# Patient Record
Sex: FEMALE | URBAN - METROPOLITAN AREA
[De-identification: names, ages, dates, MRNs, and addresses within clinical notes are randomized per-mention and may not be internally consistent; named-entity substitution may affect disease eponyms.]

---

## 2018-07-23 ENCOUNTER — TRANSFERRED RECORDS (OUTPATIENT)
Dept: HEALTH INFORMATION MANAGEMENT | Facility: CLINIC | Age: 28
End: 2018-07-23

## 2018-07-23 LAB
CREAT SERPL-MCNC: 0.74 MG/DL (ref 0.55–1.02)
GFR SERPL CREATININE-BSD FRML MDRD: >60 ML/MIN
GLUCOSE SERPL-MCNC: 78 MG/DL (ref 70–110)
POTASSIUM SERPL-SCNC: 3.7 MMOL/L (ref 3.5–5.1)

## 2018-07-24 ENCOUNTER — HOSPITAL ENCOUNTER (INPATIENT)
Facility: CLINIC | Age: 28
LOS: 3 days | Discharge: HOME | DRG: 885 | End: 2018-07-27
Attending: PSYCHIATRY & NEUROLOGY | Admitting: PSYCHIATRY & NEUROLOGY
Payer: MEDICARE

## 2018-07-24 DIAGNOSIS — R45.851 SUICIDAL IDEATION: Primary | ICD-10-CM

## 2018-07-24 LAB
ALBUMIN SERPL-MCNC: 3.1 G/DL (ref 3.4–5)
ALP SERPL-CCNC: 58 U/L (ref 40–150)
ALT SERPL W P-5'-P-CCNC: 26 U/L (ref 0–50)
ANION GAP SERPL CALCULATED.3IONS-SCNC: 6 MMOL/L (ref 3–14)
AST SERPL W P-5'-P-CCNC: 19 U/L (ref 0–45)
BILIRUB SERPL-MCNC: 1 MG/DL (ref 0.2–1.3)
BUN SERPL-MCNC: 8 MG/DL (ref 7–30)
CALCIUM SERPL-MCNC: 8.1 MG/DL (ref 8.5–10.1)
CHLORIDE SERPL-SCNC: 109 MMOL/L (ref 94–109)
CHOLEST SERPL-MCNC: 144 MG/DL
CO2 SERPL-SCNC: 27 MMOL/L (ref 20–32)
CREAT SERPL-MCNC: 0.67 MG/DL (ref 0.52–1.04)
DEPRECATED CALCIDIOL+CALCIFEROL SERPL-MC: 39 UG/L (ref 20–75)
GFR SERPL CREATININE-BSD FRML MDRD: >90 ML/MIN/1.7M2
GLUCOSE SERPL-MCNC: 73 MG/DL (ref 70–99)
HCG UR QL: NEGATIVE
HDLC SERPL-MCNC: 68 MG/DL
LDLC SERPL CALC-MCNC: 67 MG/DL
NONHDLC SERPL-MCNC: 76 MG/DL
POTASSIUM SERPL-SCNC: 3.7 MMOL/L (ref 3.4–5.3)
PROT SERPL-MCNC: 7.3 G/DL (ref 6.8–8.8)
SODIUM SERPL-SCNC: 142 MMOL/L (ref 133–144)
TRIGL SERPL-MCNC: 43 MG/DL
TSH SERPL DL<=0.005 MIU/L-ACNC: 2.08 MU/L (ref 0.4–4)

## 2018-07-24 PROCEDURE — 80061 LIPID PANEL: CPT

## 2018-07-24 PROCEDURE — 81025 URINE PREGNANCY TEST: CPT

## 2018-07-24 PROCEDURE — 80053 COMPREHEN METABOLIC PANEL: CPT

## 2018-07-24 PROCEDURE — A9270 NON-COVERED ITEM OR SERVICE: HCPCS | Mod: GY

## 2018-07-24 PROCEDURE — 82306 VITAMIN D 25 HYDROXY: CPT

## 2018-07-24 PROCEDURE — 36415 COLL VENOUS BLD VENIPUNCTURE: CPT

## 2018-07-24 PROCEDURE — A9270 NON-COVERED ITEM OR SERVICE: HCPCS | Mod: GY | Performed by: STUDENT IN AN ORGANIZED HEALTH CARE EDUCATION/TRAINING PROGRAM

## 2018-07-24 PROCEDURE — 25000132 ZZH RX MED GY IP 250 OP 250 PS 637: Mod: GY | Performed by: STUDENT IN AN ORGANIZED HEALTH CARE EDUCATION/TRAINING PROGRAM

## 2018-07-24 PROCEDURE — 25000132 ZZH RX MED GY IP 250 OP 250 PS 637: Mod: GY

## 2018-07-24 PROCEDURE — 99223 1ST HOSP IP/OBS HIGH 75: CPT | Mod: AI | Performed by: PSYCHIATRY & NEUROLOGY

## 2018-07-24 PROCEDURE — 84443 ASSAY THYROID STIM HORMONE: CPT

## 2018-07-24 PROCEDURE — 12400007 ZZH R&B MH INTERMEDIATE UMMC

## 2018-07-24 RX ORDER — ESCITALOPRAM OXALATE 5 MG/1
5 TABLET ORAL DAILY
Status: DISCONTINUED | OUTPATIENT
Start: 2018-07-24 | End: 2018-07-27 | Stop reason: HOSPADM

## 2018-07-24 RX ORDER — ACETAMINOPHEN 325 MG/1
650 TABLET ORAL EVERY 4 HOURS PRN
Status: DISCONTINUED | OUTPATIENT
Start: 2018-07-24 | End: 2018-07-27 | Stop reason: HOSPADM

## 2018-07-24 RX ORDER — PRAZOSIN HYDROCHLORIDE 1 MG/1
1 CAPSULE ORAL
Status: DISCONTINUED | OUTPATIENT
Start: 2018-07-24 | End: 2018-07-27 | Stop reason: HOSPADM

## 2018-07-24 RX ORDER — HYDROXYZINE HYDROCHLORIDE 25 MG/1
25 TABLET, FILM COATED ORAL EVERY 4 HOURS PRN
Status: DISCONTINUED | OUTPATIENT
Start: 2018-07-24 | End: 2018-07-27 | Stop reason: HOSPADM

## 2018-07-24 RX ORDER — TRAZODONE HYDROCHLORIDE 50 MG/1
50 TABLET, FILM COATED ORAL
Status: DISCONTINUED | OUTPATIENT
Start: 2018-07-24 | End: 2018-07-27 | Stop reason: HOSPADM

## 2018-07-24 RX ADMIN — TRAZODONE HYDROCHLORIDE 50 MG: 50 TABLET ORAL at 21:01

## 2018-07-24 RX ADMIN — ESCITALOPRAM OXALATE 5 MG: 5 TABLET, FILM COATED ORAL at 14:04

## 2018-07-24 ASSESSMENT — ACTIVITIES OF DAILY LIVING (ADL)
RETIRED_EATING: 0-->INDEPENDENT
DRESS: 0-->INDEPENDENT
BATHING: 0-->INDEPENDENT
AMBULATION: 0-->INDEPENDENT
ORAL_HYGIENE: INDEPENDENT
COGNITION: 0 - NO COGNITION ISSUES REPORTED
GROOMING: INDEPENDENT
RETIRED_COMMUNICATION: 0-->UNDERSTANDS/COMMUNICATES WITHOUT DIFFICULTY
TRANSFERRING: 0-->INDEPENDENT
SWALLOWING: 0-->SWALLOWS FOODS/LIQUIDS WITHOUT DIFFICULTY
DRESS: STREET CLOTHES;INDEPENDENT
TOILETING: 0-->INDEPENDENT

## 2018-07-24 NOTE — PLAN OF CARE
Problem: Patient Care Overview  Goal: Individualization & Mutuality  Patient's goals:  Feel Better  Absence of SI    Plan for admission:  1. Stabilization of mood disorder symptoms  2. Absence of SI- safe with self  3. Medication management per MD's  4. Coordination of care with outpatient providers, family  5. Psychiatric follow up care in place
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
## 2018-07-24 NOTE — IP AVS SNAPSHOT
MRN:[id number]                      After Visit Summary   7/24/2018    Nessa Hurley    MRN: [id number]           Thank you!     Thank you for choosing [facility] for your care. Our goal is always to provide you with excellent care.        Patient Information     Date Of Birth          1990        Designated Caregiver       Most Recent Value    Caregiver    Will someone help with your care after discharge? yes    Name of designated caregiver Chata Lutz    Phone number of caregiver [phone]    Caregiver address [address] [place] [address]      About your hospital stay     You were admitted on:  July 24, 2018 You last received care in the:  [facility] 30NR    You were discharged on:  July 27, 2018       Who to Call     For medical emergencies, please call 911.  For non-urgent questions about your medical care, please call your primary care provider or clinic, None          Attending Provider     Provider Alissa Owens MD Psychiatry       Primary Care Provider    None Specified      Further instructions from your care team       Behavioral Discharge Planning and Instructions    Summary:   You were admitted to Station 20 on 7/23/18  with worsening depression, PTSD symptoms and SIB under the care of Dr. Saunders.  You met with Dr. Walton and his team daily for ongoing psychiatric assessment and medication management.  You had opportunities to participate in therapeutic groups on the unit.   At this time you report your mood has stabilized and you report you are not having thoughts or intent to harm yourself or others. You will be discharged home and will resume care with your outpatient providers.  You have been referred for Critical access hospital services and they will contact you with an intake appt.    Disposition:  Home    Main Diagnosis:   Major Depressive Disorder  PTSD    Major Treatments, Procedures and Findings:   Medications were  managed throughout your stay. An  "internal medicine consult was completed during your stay. You had the opportunity to participate in treatment programming while on the unit including occupational therapy, mental health support and education and spiritual services.     Symptoms to Report:   Please report if you are experiencing increased aggression and/or confusion, problematic loss of sleep, worsening mood, or thoughts of suicide to your treatment team or notify your primary provider.   IF THE SYMPTOMS YOU ARE EXPERIENCING ARE A MEDICAL EMERGENCY, CALL 911 IMMEDIATELY    Lifestyle Adjustment:   1. Adjust your lifestyle to get enough sleep, relaxation, exercise and good nutrition.  Continue to develop healthy coping skills to decrease stress and promote a healthy lifestyle.  2. Abstain from all substances of abuse  3. Take medications as prescribed.  Please work with your doctor to discuss any concerns you have with your medications or side effects you may be experiencing.  4. Follow up with appointments as scheduled.        Psychiatry Follow-up:     Appointment: Therapist: Daryl Matson: 7/31/18 at 3:00pm  [facility]: [address] [place] [address]   [phone]    Appointment: Psychiatrist: Dr. Ame Cuenca: 8/3/18 at 1:00pm  [facility]:  [address] [place] [address]  [phone]  (fax: [phone])      [facility] RN Case Manager Georgette : [phone]    A referral has been made for [facility] through [facility] [phone] They will contact you at home to schedule an intake appointment.  PLease call them if you do not hear from them [phone]    Resources:   *[facility] Crisis: COPE: ([phone]) 24 hour mobile crisis support for people having a mental health crisis in [facility].   *Acute Psychiatric Services ([phone]). 24-hour walk-in crisis psychiatric support at [facility]; Emergency Medications Clinic available 7:30am - 2:00pm  *Zkko7tqlq: Text  \"life\"  to [phone]   A " "trained crisis counselor will respond immediately  *Crisis [facility]: ([phone])  24-hour confidential telephone counseling   *[facility] Emergency Room: [phone]    General Medication Instructions:   See your medication sheet(s) for instructions.   Take all medicines as directed.  Make no changes unless your doctor suggests them.   Go to all your doctor visits.  Be sure to have all your required lab tests. This way, your medicines can be refilled on time.  Do not use any drugs not prescribed by your doctor.    The treatment team has appreciated the opportunity to work with you.  We wish you the best in the future.    If you have any questions or concerns our unit number is [phone].     Pending Results     No orders found from 7/22/2018 to 7/25/2018.            Statement of Approval     Ordered          07/27/18 0691  I have reviewed and agree with all the recommendations and orders detailed in this document.  EFFECTIVE NOW     Approved and electronically signed by:  Jacky Lerner MD             Admission Information     Date & Time Provider Department Dept. Phone    7/24/2018 Alissa Saunders MD  30NR [phone]      Your Vitals Were     Blood Pressure Pulse Temperature Respirations Height Weight    105/70 (BP Location: Left arm) 64 98.3  F (36.8  C) (Oral) 16 1.524 m (5') 51.7 kg (114 lb)    Pulse Oximetry BMI (Body Mass Index)                100% 22.26 kg/m2          MyChart Information     [employer] lets you send messages to your doctor, view your test results, renew your prescriptions, schedule appointments and more. To sign up, go to [email or web address] . Click on \"Log in\" on the left side of the screen, which will take you to the Welcome page. Then click on \"Sign up Now\" on the right side of the page.     You will be asked to enter the access code listed below, as well as some personal information. Please follow the directions to create your username " and password.     Your access code is: [email or web address]  Expires: 10/25/2018 12:41 PM     Your access code will  in 90 days. If you need help or a new code, please call your [facility] clinic or [phone].        Care EveryWhere ID     This is your Care EveryWhere ID. This could be used by other organizations to access your [facility] medical records  [id number]        Equal Access to Services     JUS BUCK : Hadii aad ku hadasho Soomaali, waaxda luqadaha, qaybta kaalmada adeegyada, waxay maria estherin hayozn maria luisa mikaelaradames larose . So [facility] [phone].    ATENCIÓN: Si habla garry, tiene a moncada disposición servicios gratuitos de asistencia lingüística. Llame al [phone].    We comply with applicable federal civil rights laws and [place] laws. We do not discriminate on the basis of race, color, national origin, age, disability, sex, sexual orientation, or gender identity.               Review of your medicines      START taking        Dose / Directions    escitalopram 5 MG tablet   Commonly known as:  LEXAPRO        Dose:  5 mg   Start taking on:  2018   Take 1 tablet (5 mg) by mouth daily   Quantity:  30 tablet   Refills:  0       prazosin 1 MG capsule   Commonly known as:  MINIPRESS        Dose:  1 mg   Take 1 capsule (1 mg) by mouth nightly as needed (As needed for sleep difficulties related to nightmares)   Quantity:  30 capsule   Refills:  0       traZODone 50 MG tablet   Commonly known as:  DESYREL        Dose:  50 mg   Take 1 tablet (50 mg) by mouth nightly as needed for sleep   Quantity:  30 tablet   Refills:  0            Where to get your medicines      Some of these will need a paper prescription and others can be bought over the counter. Ask your nurse if you have questions.     Bring a paper prescription for each of these medications     escitalopram 5 MG tablet    prazosin 1 MG capsule    traZODone 50 MG tablet                Protect others around you: Learn how to safely use, store and throw  away your medicines at www.disposemymeds.org.             Medication List: This is a list of all your medications and when to take them. Check marks below indicate your daily home schedule. Keep this list as a reference.      Medications           Morning Afternoon Evening Bedtime As Needed    escitalopram 5 MG tablet   Commonly known as:  LEXAPRO   Take 1 tablet (5 mg) by mouth daily   Start taking on:  7/28/2018   Last time this was given:  5 mg on 7/27/2018  7:59 AM                                prazosin 1 MG capsule   Commonly known as:  MINIPRESS   Take 1 capsule (1 mg) by mouth nightly as needed (As needed for sleep difficulties related to nightmares)   Last time this was given:  1 mg on 7/26/2018  8:42 PM                                traZODone 50 MG tablet   Commonly known as:  DESYREL   Take 1 tablet (50 mg) by mouth nightly as needed for sleep   Last time this was given:  50 mg on 7/26/2018  8:43 PM

## 2018-07-24 NOTE — PLAN OF CARE
Problem: Patient Care Overview  Goal: Team Discussion  Team Plan:   BEHAVIORAL TEAM DISCUSSION    Participants:   Dr. Saunders, Dr. Ruvalcaba, Jessica Giraldo MA.LP, Med student, Pharm Student, Nursing student    Continued Stay Criteria/Rationale:   Worsening depression, SI, SIB, PTSD    Medical/Physical:   None    Precautions:   Behavioral Orders   Procedures     Code 1 - Restrict to Unit     Routine Programming     As clinically indicated     Self Injury Precaution     Status 15     Every 15 minutes.     Suicide precautions     Patients on Suicide Precautions should have a Combination Diet ordered that includes a Diet selection(s) AND a Behavioral Tray selection for Safe Tray - with utensils, or Safe Tray - NO utensils       Plan:   Patient will have ongoing psychiatric assessment.  Medications will be reviewed and adjusted per MD as indicated. Outpatient providers will be contacted for care coordination.  Hospital staff will provide a safe environment and a therapeutic milieu. Patient will be encouraged to participate in unit groups and activities. Patient will return home when stable   CTC will continue to assess needs and  ensure appropriate follow up care is in place.       Rationale for change in precautions or plan:   No change in plan/precautions

## 2018-07-24 NOTE — PROGRESS NOTES
Pt has not attended scheduled occupational therapy sessions. Encourage attendance and participation.  Pt will be given self-assessment form, and OT staff will explain the purpose of including them in their treatment plan and offer options for meeting their needs.

## 2018-07-24 NOTE — IP AVS SNAPSHOT
30    [address]    [place] [address]    Phone:  [phone]                                       After Visit Summary   7/24/2018    Nessa Hurley    MRN: [id number]           After Visit Summary Signature Page     I have received my discharge instructions, and my questions have been answered. I have discussed any challenges I see with this plan with the nurse or doctor.    ..........................................................................................................................................  Patient/Patient Representative Signature      ..........................................................................................................................................  Patient Representative Print Name and Relationship to Patient    ..................................................               ................................................  Date                                            Time    ..........................................................................................................................................  Reviewed by Signature/Title    ...................................................              ..............................................  Date                                                            Time

## 2018-07-24 NOTE — PROGRESS NOTES
07/24/18 0354   Patient Belongings   Did you bring any home meds/supplements to the hospital?  No   Patient Belongings cell phone/electronics;clothing;plastic bag;purse;shoes;other (see comments)   Disposition of Belongings Other (see comment);Locker;Sent to security per site process   Belongings Search Yes   Clothing Search Yes   Second Staff Valeriano ANDREWS     INVENTORY OF PATIENT'S BELONGINGS  One LG cell phone  One Red /grey head scarf  One Brown boots  One Blue [employer] fleece sweatshirt  One black leggings  One blue tank top  One green pouch with insurances cards and ID  [place] ID    [employer]pt [facility] [employer] affairs ID  Insurance Cards    ITEMS SEND TO [facility] [employer] [place] Visa Debit Card-------[id number]  [employer] debit card-------[phone]    A               Admission:  I am responsible for any personal items that are not sent to the safe or pharmacy.  [facility] is not responsible for loss, theft or damage of any property in my possession.    Signature:  _________________________________ Date: _______  Time: _____                                              Staff Signature:  ____________________________ Date: ________  Time: _____      2nd Staff person, if patient is unable/unwilling to sign:    Signature: ________________________________ Date: ________  Time: _____     Discharge:  [facility] has returned all of my personal belongings:    Signature: _________________________________ Date: ________  Time: _____                                          Staff Signature:  ____________________________ Date: ________  Time: _____
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
## 2018-07-25 PROCEDURE — A9270 NON-COVERED ITEM OR SERVICE: HCPCS | Mod: GY | Performed by: STUDENT IN AN ORGANIZED HEALTH CARE EDUCATION/TRAINING PROGRAM

## 2018-07-25 PROCEDURE — 25000132 ZZH RX MED GY IP 250 OP 250 PS 637: Mod: GY

## 2018-07-25 PROCEDURE — 12400007 ZZH R&B MH INTERMEDIATE UMMC

## 2018-07-25 PROCEDURE — 99232 SBSQ HOSP IP/OBS MODERATE 35: CPT | Mod: GC | Performed by: PSYCHIATRY & NEUROLOGY

## 2018-07-25 PROCEDURE — A9270 NON-COVERED ITEM OR SERVICE: HCPCS | Mod: GY

## 2018-07-25 PROCEDURE — 25000132 ZZH RX MED GY IP 250 OP 250 PS 637: Mod: GY | Performed by: STUDENT IN AN ORGANIZED HEALTH CARE EDUCATION/TRAINING PROGRAM

## 2018-07-25 RX ADMIN — PRAZOSIN HYDROCHLORIDE 1 MG: 1 CAPSULE ORAL at 21:31

## 2018-07-25 RX ADMIN — TRAZODONE HYDROCHLORIDE 50 MG: 50 TABLET ORAL at 21:31

## 2018-07-25 RX ADMIN — TRAZODONE HYDROCHLORIDE 50 MG: 50 TABLET ORAL at 21:14

## 2018-07-25 RX ADMIN — ESCITALOPRAM OXALATE 5 MG: 5 TABLET, FILM COATED ORAL at 19:04

## 2018-07-25 ASSESSMENT — ACTIVITIES OF DAILY LIVING (ADL)
ORAL_HYGIENE: INDEPENDENT
GROOMING: INDEPENDENT
LAUNDRY: WITH SUPERVISION
ORAL_HYGIENE: INDEPENDENT
DRESS: SCRUBS (BEHAVIORAL HEALTH)
DRESS: SCRUBS (BEHAVIORAL HEALTH)
HYGIENE/GROOMING: INDEPENDENT

## 2018-07-25 NOTE — PROGRESS NOTES
Referral was made for [facility] through [facility] in [place].  Patient was very much interested in assistance at her apartment. Patient was informed that [facility] will contact her at home to schedule an intake.

## 2018-07-25 NOTE — PROGRESS NOTES
"Pt is quiet, pleasant, cooperative and her stated goal is to increase her participation in groups/milieu activities.  She says that \"being around big groups terrifies me, but I am working on it.\"  She denies SI/SIB's and stated, \"This is the fist time that I can actually say I'm starting to feel hope with my mental illness.\"  She also talked about her young son and her worries about not being able to care for him, but added that she is grateful knowing he's safe at this time with family.  She reported that she feels safe at the hospital - \"I feel like I sleep better here than I have in the last 4 years.\"  As far as concerns, pt states \"I've been on different meds for MH, but I don't like the side effects... one thing that has actually helped is medical marijuana, but the problem is that it's too expensive.\"  "

## 2018-07-25 NOTE — PROGRESS NOTES
"CLINICAL NUTRITION SERVICES - ASSESSMENT NOTE     Nutrition Prescription    RECOMMENDATIONS FOR MDs/PROVIDERS TO ORDER:  None today    Malnutrition Status:    Patient does not meet two of the above criteria necessary for diagnosing malnutrition    Recommendations already ordered by Registered Dietitian (RD):  None today    Future/Additional Recommendations:  If PO intakes decline, consider sending Boost Plus     REASON FOR ASSESSMENT  Nessa Hurley is a/an 28 year old female assessed by the dietitian for Admission Nutrition Risk Screen for reduced oral intake over the last month    NUTRITION HISTORY  - Pt reports eating between 12:00 and 18:00, usually eating 2 meals. She really enjoys fish, onions, peas, and PB&honey sandwiches.   - She drinks apple cider vinegar daily and also takes a women's multivitamin and biotene     CURRENT NUTRITION ORDERS  Diet: Regular  Intake/Tolerance: reports eating pizza, and carrots for lunch today.     LABS  Labs reviewed    MEDICATIONS  Medications reviewed    ANTHROPOMETRICS  Height: 152.4 cm (5' 0\")  Most Recent Weight: 52.2 kg (115 lb)    IBW: 45.5 kg (115% IBW)  BMI: Normal BMI  Weight History: pt has lost 4 lbs over the last 5 months. She reports she feels leaner and is gaining more muscle and getting stronger.   Wt Readings from Last 5 Encounters:  07/24/18 : 52.2 kg (115 lb)  02/24/18 : 54 kg (119 lb) -- OSH  06/24/17 : 60 kg (132 lb 4.8 oz) -- OSH    Dosing Weight: 52 kg - admit wt    ASSESSED NUTRITION NEEDS  Estimated Energy Needs: 9576-4637 kcals/day (25 - 30 kcals/kg)  Justification: Maintenance  Estimated Protein Needs: 42-52 grams protein/day (0.8 - 1 grams of pro/kg)  Justification: Maintenance  Estimated Fluid Needs: 1 mL/kcal  Justification: Per provider pending fluid status    PHYSICAL FINDINGS  See malnutrition section below.     MALNUTRITION  % Intake: Decreased intake does not meet criteria  % Weight Loss: Weight loss does not meet criteria  Subcutaneous Fat " Loss: None observed  Muscle Loss: None observed  Fluid Accumulation/Edema: None noted  Malnutrition Diagnosis: Patient does not meet two of the above criteria necessary for diagnosing malnutrition    NUTRITION DIAGNOSIS  Predicted protein-energy intake related to variable appetite as evidenced by pt reliant on PO intakes to meet 100% of nutritional needs with potential for variation       INTERVENTIONS  Implementation  Discussed nutrition history and PO since admission. Discussed menu ordering and snacks available on the unit. Pt feels she is already eating better and feels she is getting enough to eat here. Encouraged adequate PO of food and fluids.    Goals  Patient to consume % of nutritionally adequate meal trays TID, or the equivalent with supplements/snacks.     Monitoring/Evaluation  Progress toward goals will be monitored and evaluated per protocol.      Betty Snider RD, LD  Unit Pager: [phone]
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
## 2018-07-26 PROCEDURE — 25000132 ZZH RX MED GY IP 250 OP 250 PS 637: Mod: GY | Performed by: STUDENT IN AN ORGANIZED HEALTH CARE EDUCATION/TRAINING PROGRAM

## 2018-07-26 PROCEDURE — A9270 NON-COVERED ITEM OR SERVICE: HCPCS | Mod: GY | Performed by: STUDENT IN AN ORGANIZED HEALTH CARE EDUCATION/TRAINING PROGRAM

## 2018-07-26 PROCEDURE — 99232 SBSQ HOSP IP/OBS MODERATE 35: CPT | Mod: GC | Performed by: PSYCHIATRY & NEUROLOGY

## 2018-07-26 PROCEDURE — 25000132 ZZH RX MED GY IP 250 OP 250 PS 637: Mod: GY

## 2018-07-26 PROCEDURE — 12400007 ZZH R&B MH INTERMEDIATE UMMC

## 2018-07-26 PROCEDURE — A9270 NON-COVERED ITEM OR SERVICE: HCPCS | Mod: GY

## 2018-07-26 RX ADMIN — TRAZODONE HYDROCHLORIDE 50 MG: 50 TABLET ORAL at 20:43

## 2018-07-26 RX ADMIN — ESCITALOPRAM OXALATE 5 MG: 5 TABLET, FILM COATED ORAL at 09:15

## 2018-07-26 RX ADMIN — PRAZOSIN HYDROCHLORIDE 1 MG: 1 CAPSULE ORAL at 20:42

## 2018-07-26 RX ADMIN — TRAZODONE HYDROCHLORIDE 50 MG: 50 TABLET ORAL at 20:42

## 2018-07-26 ASSESSMENT — ACTIVITIES OF DAILY LIVING (ADL)
ORAL_HYGIENE: INDEPENDENT
LAUNDRY: WITH SUPERVISION
GROOMING: INDEPENDENT
DRESS: INDEPENDENT

## 2018-07-26 NOTE — PROGRESS NOTES
I spoke with patient's CM RN [facility] the [facility] - Georgette ([phone])  She requested an update on how patient is doing. She stated that patient actually has had a lot of resources provided through the [facility] for both patient and her son.(both mental health services and legal services- both of which patient has been asking for resources while here)  RN was able to get patient into see her therapist next week (7/31) and patient has appt with Dr. Fransisco Pavon on 8/3.  I informed her that patient will likely discharge home tomorrow or early next week. Will inform patient

## 2018-07-26 NOTE — DISCHARGE INSTRUCTIONS
Behavioral Discharge Planning and Instructions    Summary:   You were admitted to [facility] on 7/23/18  with worsening depression, PTSD symptoms and SIB under the care of Dr. Saunders.  You met with Dr. Walton and his team daily for ongoing psychiatric assessment and medication management.  You had opportunities to participate in therapeutic groups on the unit.   At this time you report your mood has stabilized and you report you are not having thoughts or intent to harm yourself or others. You will be discharged home and will resume care with your outpatient providers.  You have been referred for [facility] services and they will contact you with an intake appt.    Disposition:  Home    Main Diagnosis:   Major Depressive Disorder  PTSD    Major Treatments, Procedures and Findings:   Medications were  managed throughout your stay. An internal medicine consult was completed during your stay. You had the opportunity to participate in treatment programming while on the unit including occupational therapy, mental health support and education and spiritual services.     Symptoms to Report:   Please report if you are experiencing increased aggression and/or confusion, problematic loss of sleep, worsening mood, or thoughts of suicide to your treatment team or notify your primary provider.   IF THE SYMPTOMS YOU ARE EXPERIENCING ARE A MEDICAL EMERGENCY, CALL 911 IMMEDIATELY    Lifestyle Adjustment:   1. Adjust your lifestyle to get enough sleep, relaxation, exercise and good nutrition.  Continue to develop healthy coping skills to decrease stress and promote a healthy lifestyle.  2. Abstain from all substances of abuse  3. Take medications as prescribed.  Please work with your doctor to discuss any concerns you have with your medications or side effects you may be experiencing.  4. Follow up with appointments as scheduled.        Psychiatry Follow-up:     Appointment: Therapist: Daryl Matson: 7/31/18 at 3:00pm  [facility]: [id number]  [place] [address]   [phone]    Appointment: Psychiatrist: Dr. Ame Cuenca: 8/3/18 at 1:00pm  [facility]:  [address] [place] [address]  [phone]  (fax: [phone])      [facility] RN Case Manager Georgette : [phone]    A referral has been made for [facility] Services through [employer] [phone] They will contact you at home to schedule an intake appointment.  PLease call them if you do not hear from them [phone]    Resources:   *[facility] Crisis: COPE: ([phone]) 24 hour mobile crisis support for people having a mental health crisis in [facility].   *Acute Psychiatric Services ([phone]). 24-hour walk-in crisis psychiatric support at [facility]; Emergency Medications Clinic available 7:30am - 2:00pm  *[employer]: Text  \"life\"  to [address]   A trained crisis counselor will respond immediately  *[facility]: ([phone])  24-hour confidential telephone counseling   *[facility] Emergency Room: [phone]    General Medication Instructions:   See your medication sheet(s) for instructions.   Take all medicines as directed.  Make no changes unless your doctor suggests them.   Go to all your doctor visits.  Be sure to have all your required lab tests. This way, your medicines can be refilled on time.  Do not use any drugs not prescribed by your doctor.    The treatment team has appreciated the opportunity to work with you.  We wish you the best in the future.    If you have any questions or concerns our unit number is [phone].   "
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
## 2018-07-26 NOTE — PROGRESS NOTES
Pt denies SI/SIB or hallucinations. Attended and participated in group. Pt presented with a bright affect and was calm. Pt stated that her goal is to be able to ask for help when she needs it and not feel ashamed by it.

## 2018-07-27 VITALS
HEIGHT: 60 IN | SYSTOLIC BLOOD PRESSURE: 105 MMHG | RESPIRATION RATE: 16 BRPM | HEART RATE: 64 BPM | OXYGEN SATURATION: 100 % | DIASTOLIC BLOOD PRESSURE: 70 MMHG | BODY MASS INDEX: 22.38 KG/M2 | WEIGHT: 114 LBS | TEMPERATURE: 98.3 F

## 2018-07-27 PROCEDURE — 25000132 ZZH RX MED GY IP 250 OP 250 PS 637: Mod: GY | Performed by: STUDENT IN AN ORGANIZED HEALTH CARE EDUCATION/TRAINING PROGRAM

## 2018-07-27 PROCEDURE — 99238 HOSP IP/OBS DSCHRG MGMT 30/<: CPT | Mod: GC | Performed by: PSYCHIATRY & NEUROLOGY

## 2018-07-27 PROCEDURE — A9270 NON-COVERED ITEM OR SERVICE: HCPCS | Mod: GY

## 2018-07-27 PROCEDURE — 25000132 ZZH RX MED GY IP 250 OP 250 PS 637: Mod: GY

## 2018-07-27 PROCEDURE — A9270 NON-COVERED ITEM OR SERVICE: HCPCS | Mod: GY | Performed by: STUDENT IN AN ORGANIZED HEALTH CARE EDUCATION/TRAINING PROGRAM

## 2018-07-27 RX ORDER — TRAZODONE HYDROCHLORIDE 50 MG/1
50 TABLET, FILM COATED ORAL
Qty: 30 TABLET | Refills: 0 | Status: SHIPPED | OUTPATIENT
Start: 2018-07-27

## 2018-07-27 RX ORDER — PRAZOSIN HYDROCHLORIDE 1 MG/1
1 CAPSULE ORAL
Qty: 30 CAPSULE | Refills: 0 | Status: SHIPPED | OUTPATIENT
Start: 2018-07-27

## 2018-07-27 RX ORDER — ESCITALOPRAM OXALATE 5 MG/1
5 TABLET ORAL DAILY
Qty: 30 TABLET | Refills: 0 | Status: SHIPPED | OUTPATIENT
Start: 2018-07-28

## 2018-07-27 RX ADMIN — ESCITALOPRAM OXALATE 5 MG: 5 TABLET, FILM COATED ORAL at 07:59

## 2018-07-27 RX ADMIN — HYDROXYZINE HYDROCHLORIDE 25 MG: 25 TABLET ORAL at 08:32

## 2018-07-27 ASSESSMENT — ACTIVITIES OF DAILY LIVING (ADL)
ORAL_HYGIENE: INDEPENDENT
DRESS: INDEPENDENT
GROOMING: INDEPENDENT

## 2018-07-27 NOTE — PROGRESS NOTES
Reviewed discharge paperwork with pt. Pt pleasant and cooperative.  Pt denies SI.  Pt reports feels ready for discharge.  Pt denied any SI.

## 2018-07-27 NOTE — PROGRESS NOTES
07/26/18 2100   Behavioral Health   Hallucinations denies / not responding to hallucinations   Thinking intact   Orientation person, disoriented;place, disoriented;date, disoriented   Memory baseline memory   Insight insight appropriate to events   Judgement impaired   Eye Contact at examiner   Affect full range affect   Mood anxious   Physical Appearance/Attire attire appropriate to age and situation   Hygiene well groomed   Suicidality other (see comments)   1. Wish to be Dead No   2. Non-Specific Active Suicidal Thoughts  No   Pt present in milieu social with peers. Pt denies depression, SI and SIB. Pt reports feeling a little anxious about meeting with PCA concerning her son tomorrow. Pt reports her new meds are helping and she is looking forward to going tho therapy for PTSD toget help. Pt pleasant and cooperative.

## 2018-07-28 NOTE — PROGRESS NOTES
Pt discharged home. Pt's Mom picked her up . She was sent with all belongings and medications. Pt verbalized and agreed with follow-up discharge plans. Pt denied any SI at time of discharge.